# Patient Record
Sex: MALE | Race: WHITE
[De-identification: names, ages, dates, MRNs, and addresses within clinical notes are randomized per-mention and may not be internally consistent; named-entity substitution may affect disease eponyms.]

---

## 2020-01-22 ENCOUNTER — HOSPITAL ENCOUNTER (EMERGENCY)
Dept: HOSPITAL 39 - ER | Age: 40
Discharge: HOME | End: 2020-01-22
Payer: SELF-PAY

## 2020-01-22 VITALS — DIASTOLIC BLOOD PRESSURE: 79 MMHG | OXYGEN SATURATION: 95 % | SYSTOLIC BLOOD PRESSURE: 132 MMHG

## 2020-01-22 VITALS — TEMPERATURE: 97.2 F

## 2020-01-22 DIAGNOSIS — Z87.891: ICD-10-CM

## 2020-01-22 DIAGNOSIS — J10.1: Primary | ICD-10-CM

## 2020-01-22 NOTE — ED.PDOC
History of Present Illness





- General


Chief Complaint: General


Stated Complaint: Bodyaches, cough, headache


Time Seen by Provider: 01/22/20 09:15


Additional Information: 





Pt w cc of flu-like sx for 2 days.  Pt w body aches, subj LGF, fatigue, nasal 

congestion.  Neg CP, SOB, AP.  Pt is o/w asx.





- History of Present Illness


Allergies/Adverse Reactions: 


Allergies





NO KNOWN ALLERGY Allergy (Verified 01/22/20 09:14)


   





Home Medications: 


Ambulatory Orders





Acetaminophen [Tylenol] 650 mg PO Q6H #50 tab 01/22/20 


Oseltamivir Capsule [Tamiflu] 75 mg PO DAILY 5 Days #5 capsule 01/22/20 











Review of Systems





- Review of Systems


Constitutional: States: fever, malaise.  Denies: chills


EENTM: States: nose congestion.  Denies: ear pain, nose pain, throat pain


Respiratory: States: cough - occ, dry


Cardiology: Denies: chest pain, palpitations


Genitourinary: States: no symptoms reported


Musculoskeletal: States: muscle pain


Skin: Denies: rash


Neurological: States: no symptoms reported


All other Systems: Reviewed and Negative





Past Medical History (General)





- Patient Medical History


Hx Stroke: No


Hx Congestive Heart Failure: No


Hx Diabetes: No


Hx Gastroesophageal Reflux: No - IBS





- Vaccination History


Hx Influenza Vaccination: Yes - 2019





- Social History


Hx Tobacco Use: Yes


Hx Alcohol Use: No





Family Medical History





- Family History


  ** Father


Family History: No Known


Living Status: Still Living


Hx Family Hypertension: Yes





Physical Exam





- Physical Exam


General Appearance: Alert, Comfortable, No apparent distress, Well Developed, 

Well Nourished


Ears, Nose, Throat: normal ENT inspection, normal pharynx


Neck: non-tender, full range of motion, supple, normal inspection


Respiratory: chest non-tender, lungs clear, normal breath sounds, no respiratory

 distress, no accessory muscle use


Cardiovascular/Chest: normal peripheral pulses, regular rate, rhythm, no edema, 

no gallop, no JVD, no murmur


Gastrointestinal/Abdominal: normal bowel sounds, non tender, soft, no 

organomegaly


Back Exam: normal inspection, no CVA tenderness


Extremity: normal range of motion, non-tender, normal inspection


Neurologic: CNs II-XII nml as tested, no motor/sensory deficits, alert, normal 

mood/affect, oriented x 3


Skin Exam: normal color, warm/dry


Lymphatic: no adenopathy





Progress





- Progress


Progress: 





01/22/20 09:53


Pt is flu-positive.  Will dc w tamiflu and tylenol and pt to rest and f/u w his 

PCP.  RTED precautions d/w pt who voices understand.  Pt VSS and NAD, he is safe

 for dc w outpt fu.





Departure





- Departure


Clinical Impression: 


 Influenza





Time of Disposition: 09:54


Disposition: Discharge to Home or Self Care


Condition: Good


Departure Forms:  ED Discharge - Pt. Copy, Patient Portal Self Enrollment


Referrals: 


ILYA FAIR MD [Active Staff] - 1-5 Days


Prescriptions: 


Acetaminophen [Tylenol] 650 mg PO Q6H #50 tab


Oseltamivir Capsule [Tamiflu] 75 mg PO DAILY 5 Days #5 capsule


Home Medications: 


Ambulatory Orders





Acetaminophen [Tylenol] 650 mg PO Q6H #50 tab 01/22/20 


Oseltamivir Capsule [Tamiflu] 75 mg PO DAILY 5 Days #5 capsule 01/22/20

## 2020-11-02 ENCOUNTER — HOSPITAL ENCOUNTER (EMERGENCY)
Dept: HOSPITAL 39 - ER | Age: 40
Discharge: HOME | End: 2020-11-02
Payer: SELF-PAY

## 2020-11-02 VITALS — SYSTOLIC BLOOD PRESSURE: 133 MMHG | DIASTOLIC BLOOD PRESSURE: 90 MMHG

## 2020-11-02 VITALS — OXYGEN SATURATION: 97 % | TEMPERATURE: 97.3 F

## 2020-11-02 DIAGNOSIS — H66.001: ICD-10-CM

## 2020-11-02 DIAGNOSIS — Z88.0: ICD-10-CM

## 2020-11-02 DIAGNOSIS — Z87.891: ICD-10-CM

## 2020-11-02 DIAGNOSIS — H61.21: ICD-10-CM

## 2020-11-02 DIAGNOSIS — H60.331: Primary | ICD-10-CM

## 2020-11-02 NOTE — ED.PDOC
History of Present Illness





- General


Time Seen by Provider: 11/02/20 15:40


Source: patient





- History of Present Illness


Initial Comments: 





40-year-old male who presents with chief complaint of right ear pain.  Onset 1 

week ago with gradual worsening.  Much worse since this morning.  Reports is 

constant throbbing 8/10 severity pain inside the right ear, radiates to the 

outer ear and also down the right jaw, worse with palpation of the ear.  He 

tried ibuprofen 400 mg this morning with little relief.  He additionally reports

large amount of watery discharge from the ear.  He has been using Q-tips 

frequently with significant amount of wax and discharge as well.  Additionally 

reports slight decreased hearing in the right ear.  Denies any fevers, chills, 

sore throat, congestion, cough, chest pain, abdominal pain, other systemic 

symptoms.





Allergies/Adverse Reactions: 


Allergies





Penicillins Allergy (Verified 11/02/20 15:56)


   





Home Medications: 


Ambulatory Orders





Acetaminophen [Tylenol] 650 mg PO Q6H #50 tab 01/22/20 


Oseltamivir Capsule [Tamiflu] 75 mg PO DAILY 5 Days #5 capsule 01/22/20 


Acetaminophen W/ Codeine [Tylenol W/ CODEINE #3] 1 tab PO Q6H PRN 5 Days #10 tab

11/02/20 


Azithromycin Tab [Zithromax Tab] 250 mg PO DAILY 4 Days #4 tab 11/02/20 


Ciprofloxacin/Dexameth Otic [CiproDex Otic] 4 drop OTIC BID 7 Days #7.5 ml 

11/02/20 











Review of Systems





- Review of Systems


Review of Systems: 





11/02/20 15:56


as per HPI





All other Systems: Reviewed and Negative





Past Medical History (General)





- Patient Medical History


Hx Stroke: No


Hx Congestive Heart Failure: No


Hx Diabetes: No


Hx Gastroesophageal Reflux: No - IBS





- Vaccination History


Hx Influenza Vaccination: Yes - 2019





- Social History


Hx Tobacco Use: Yes


Hx Alcohol Use: No





Family Medical History





- Family History


  ** Father


Family History: No Known


Living Status: Still Living


Hx Family Hypertension: Yes





Physical Exam





- Physical Exam


General Appearance: Alert, Comfortable, No apparent distress


Eye Exam: bilateral normal


Ears, Nose, Throat: normal pharynx, abnormal TM (R) - copious cerumen impaction 

against R TM with moderate erythema of R ear canal and partially visualized R 

TM, mild pain with manipulation of R outer ear, L ear exam normal, hearing 

decreased - moderate on Right side


Neck: supple, normal inspection


Respiratory: lungs clear, normal breath sounds


Cardiovascular/Chest: regular rate, rhythm, no murmur


Gastrointestinal/Abdominal: non tender, soft


Extremity: normal inspection


Neurologic: CNs II-XII nml as tested, no motor/sensory deficits, alert, normal 

mood/affect, oriented x 3


Skin Exam: normal color, warm/dry





Progress





- Progress


Progress: 





11/02/20 15:59


Acute R ear pain


-appears due to otitis externa.  Given cerumen impaction with partially 

visualized erythematous R TM, concern also for otitis media.  Will treat in the 

ED with Debrox to R ear with gentle warm water flushing.  Then will apply 

Ciprodex 4 drops to R ear in ED & give Azithromycin 500 mg PO.  Tylenol #3 x1 fo

r pain control.


-will Rx azithromycin 250 mg daily x4 more days and Ciprodex BID x7 days on 

discharge.  Advised no more instruments/q tips in R ear.  Avoid any moisture 

getting in R ear.  Continue twice daily Debrox at home as needed for cerumen 

impaction.  F/u with PCP.





Oj Palmer MD


Billing #[id number]











Departure





- Departure


Clinical Impression: 


 Impacted cerumen of right ear





Otitis externa


Qualifiers:


 Otitis externa type: swimmer's ear Chronicity: acute Laterality: right 

Qualified Code(s): H60.331 - Swimmer's ear, right ear





Otitis media


Qualifiers:


 Otitis media type: suppurative Chronicity: acute Laterality: right Recurrence: 

non-recurrent Spontaneous tympanic membrane rupture: without spontaneous rupture

Qualified Code(s): H66.001 - Acute suppurative otitis media without spontaneous 

rupture of ear drum, right ear





Time of Disposition: 16:03


Disposition: Discharge to Home or Self Care


Condition: Good


Instructions:  Outer Ear Infection (DC), Serous Otitis Media (DC), Ear Wax 

Impaction (DC)


Diet: resume usual diet


Activity: increase activity as tolerated


Referrals: 


Rakan Horn MD [Primary Care Provider] - 1-2 Weeks


Prescriptions: 


Ciprofloxacin/Dexameth Otic [CiproDex Otic] 4 drop OTIC BID 7 Days #7.5 ml


Acetaminophen W/ Codeine [Tylenol W/ CODEINE #3] 1 tab PO Q6H PRN 5 Days #10 tab


 PRN Reason: Pain


Azithromycin Tab [Zithromax Tab] 250 mg PO DAILY 4 Days #4 tab


Home Medications: 


Ambulatory Orders





Acetaminophen [Tylenol] 650 mg PO Q6H #50 tab 01/22/20 


Oseltamivir Capsule [Tamiflu] 75 mg PO DAILY 5 Days #5 capsule 01/22/20 


Acetaminophen W/ Codeine [Tylenol W/ CODEINE #3] 1 tab PO Q6H PRN 5 Days #10 tab

11/02/20 


Azithromycin Tab [Zithromax Tab] 250 mg PO DAILY 4 Days #4 tab 11/02/20 


Ciprofloxacin/Dexameth Otic [CiproDex Otic] 4 drop OTIC BID 7 Days #7.5 ml 

11/02/20 








Additional Instructions: 


Take the antibiotics as directed and finish the full course even if well.  As 

discussed prevent any further moisture from entering the ear if possible.  Wear 

an ear plug in the right ear while showering or a cottonball with one side 

dipped in Vaseline shown into the right ear.  Avoid any further instrumentation 

inside the ear canal with Q-tips which can worsen irritation and infection of 

the outer ear.  Continue to take over-the-counter medications as needed for pain

such as ibuprofen 800 mg every 8 hours as needed and Tylenol 650 mg every 6 h

ours as needed.  You may take the Tylenol 3 as directed for breakthrough pain.  

Do not drive or operate heavy machinery when taking this medication as it may 

make you drowsy.  You may also continue to use over-the-counter Debrox in the 

right ear twice daily followed by gentle flushing with warm water with a bulb 

syringe to help with earwax impaction as needed.  Follow-up with your primary 

care physician is recommended for repeat evaluation in the next 1 to 2 weeks or 

sooner as needed.